# Patient Record
Sex: FEMALE | Race: AMERICAN INDIAN OR ALASKA NATIVE
[De-identification: names, ages, dates, MRNs, and addresses within clinical notes are randomized per-mention and may not be internally consistent; named-entity substitution may affect disease eponyms.]

---

## 2020-01-07 ENCOUNTER — HOSPITAL ENCOUNTER (EMERGENCY)
Dept: HOSPITAL 5 - ED | Age: 50
LOS: 1 days | Discharge: HOME | End: 2020-01-08
Payer: COMMERCIAL

## 2020-01-07 VITALS — SYSTOLIC BLOOD PRESSURE: 141 MMHG | DIASTOLIC BLOOD PRESSURE: 86 MMHG

## 2020-01-07 DIAGNOSIS — R11.2: ICD-10-CM

## 2020-01-07 DIAGNOSIS — R10.32: ICD-10-CM

## 2020-01-07 DIAGNOSIS — R10.31: ICD-10-CM

## 2020-01-07 DIAGNOSIS — E11.9: ICD-10-CM

## 2020-01-07 DIAGNOSIS — K76.0: Primary | ICD-10-CM

## 2020-01-07 LAB
ALBUMIN SERPL-MCNC: 4.4 G/DL (ref 3.9–5)
ALT SERPL-CCNC: 50 UNITS/L (ref 7–56)
BAND NEUTROPHILS # (MANUAL): 0 K/MM3
BILIRUB UR QL STRIP: (no result)
BLOOD UR QL VISUAL: (no result)
BUN SERPL-MCNC: 16 MG/DL (ref 7–17)
BUN/CREAT SERPL: 23 %
CALCIUM SERPL-MCNC: 9.7 MG/DL (ref 8.4–10.2)
HCT VFR BLD CALC: 45 % (ref 30.3–42.9)
HEMOLYSIS INDEX: 55
HGB BLD-MCNC: 15 GM/DL (ref 10.1–14.3)
MCHC RBC AUTO-ENTMCNC: 33 % (ref 30–34)
MCV RBC AUTO: 89 FL (ref 79–97)
MUCOUS THREADS #/AREA URNS HPF: (no result) /HPF
MYELOCYTES # (MANUAL): 0 K/MM3
PH UR STRIP: 5 [PH] (ref 5–7)
PLATELET # BLD: 317 K/MM3 (ref 140–440)
PROMYELOCYTES # (MANUAL): 0 K/MM3
PROT UR STRIP-MCNC: (no result) MG/DL
RBC # BLD AUTO: 5.07 M/MM3 (ref 3.65–5.03)
RBC #/AREA URNS HPF: 1 /HPF (ref 0–6)
TOTAL CELLS COUNTED BLD: 100
UROBILINOGEN UR-MCNC: < 2 MG/DL (ref ?–2)
WBC #/AREA URNS HPF: 2 /HPF (ref 0–6)

## 2020-01-07 PROCEDURE — 85007 BL SMEAR W/DIFF WBC COUNT: CPT

## 2020-01-07 PROCEDURE — 85025 COMPLETE CBC W/AUTO DIFF WBC: CPT

## 2020-01-07 PROCEDURE — 96374 THER/PROPH/DIAG INJ IV PUSH: CPT

## 2020-01-07 PROCEDURE — 74177 CT ABD & PELVIS W/CONTRAST: CPT

## 2020-01-07 PROCEDURE — 83690 ASSAY OF LIPASE: CPT

## 2020-01-07 PROCEDURE — 96361 HYDRATE IV INFUSION ADD-ON: CPT

## 2020-01-07 PROCEDURE — 99284 EMERGENCY DEPT VISIT MOD MDM: CPT

## 2020-01-07 PROCEDURE — 36415 COLL VENOUS BLD VENIPUNCTURE: CPT

## 2020-01-07 PROCEDURE — 80053 COMPREHEN METABOLIC PANEL: CPT

## 2020-01-07 PROCEDURE — 81001 URINALYSIS AUTO W/SCOPE: CPT

## 2020-01-07 NOTE — EMERGENCY DEPARTMENT REPORT
ED Abdominal Pain HPI





- General


Chief Complaint: Abdominal Pain


Stated Complaint: STOMACH PAIN/VOMIT


Time Seen by Provider: 20 20:30


Source: patient


Mode of arrival: Ambulatory


Limitations: No Limitations





- History of Present Illness


Initial Comments: 


Ms. Laurent is a 50 Y/O FEMALE PRESENTS TO ED C/O OF VOMITING AND RLQ SEVERE PAIN.

NO FEVER OR CHILLS. NO HEMOPTYSIS  patient has history of hypertension and 

diabetes type 2, states nausea and vomiting exacerbated by by mouth intake.  

Symptoms are relieved by nothing tried.  There is no fever no chills, no chest 

pain or shortness of breath.  Patient has PCP at [facility] 

however she was unable to get an appointment for today.Abdominal pain for over 

10  radiating to bilateral lower quadrant.  Patient denies history of abdominal 

surgeries.


MD Complaint: abdominal pain


Onset/Timin


-: days(s)


Location: LLQ, RLQ


Radiation: LLQ, RLQ


Severity: moderate


Severity scale (0 -10): 5


Quality: cramping, aching


Consistency: constant


Improves With: nothing


Worsens With: eating


Associated Symptoms: nausea, vomiting.  denies: diarrhea, fever, chills, 

constipation, dysuria, melena





- Related Data


LMP (females 10-50): other (post menopausal)


                                  Previous Rx's











 Medication  Instructions  Recorded  Last Taken  Type


 


Dicyclomine [Bentyl] 10 mg PO QID PRN #30 capsule 20 Unknown Rx


 


Naproxen 500 mg PO BID PRN #30 tablet 20 Unknown Rx


 


Ondansetron [Zofran Odt] 4 mg PO Q8HR PRN #12 tab.rapdis 20 Unknown Rx











                                    Allergies











Allergy/AdvReac Type Severity Reaction Status Date / Time


 


No Known Allergies Allergy   Verified 20 17:41














ED Review of Systems


ROS: 


Stated complaint: STOMACH PAIN/VOMIT


Other details as noted in HPI





Constitutional: denies: chills, fever


Eyes: denies: eye pain, eye discharge, vision change


ENT: denies: ear pain, throat pain


Respiratory: denies: cough, shortness of breath, wheezing


Cardiovascular: denies: chest pain, palpitations


Endocrine: no symptoms reported


Gastrointestinal: abdominal pain, nausea, vomiting.  denies: diarrhea, 

constipation


Genitourinary: as per HPI.  denies: urgency, dysuria, frequency, hematuria, 

discharge


Musculoskeletal: denies: back pain, joint swelling, arthralgia


Skin: denies: rash, lesions


Neurological: denies: headache, weakness, paresthesias


Psychiatric: denies: anxiety, depression


Hematological/Lymphatic: denies: easy bleeding, easy bruising





ED Past Medical Hx





- Past Medical History


Previous Medical History?: Yes


Hx Hypertension: No


Hx CVA: No


Hx Heart Attack/AMI: No


Hx Congestive Heart Failure: No


Hx Diabetes: Yes


Hx Deep Vein Thrombosis: No


Hx Pulmonary Embolism: No


Hx GERD: No


Hx Liver Disease: No


Hx Renal Disease: No


Hx of Cancer: No


Hx Sickle Cell Disease: No


Hx Arthritis: No


Hx Headaches / Migraines: No


Hx Seizures: No


Hx Kidney Stones: No


Hx Psychiatric Treatment: No


Hx Asthma: No


Hx COPD: No


Hx Tuberculosis: No


Hx Dementia: No


Hx HIV: No





- Surgical History


Past Surgical History?: Yes


Hx Coronary Stent: No


Hx Open Heart Surgery: No


Hx Pacemaker: No


Hx Internal Defibrillator: No


Hx Cholecystectomy: No


Hx Appendectomy: No


Hx Breast Surgery: No


Additional Surgical History: l5 fusion





- Social History


Smoking Status: Never Smoker


Substance Use Type: None





- Medications


Home Medications: 


                                Home Medications











 Medication  Instructions  Recorded  Confirmed  Last Taken  Type


 


Dicyclomine [Bentyl] 10 mg PO QID PRN #30 capsule 20  Unknown Rx


 


Naproxen 500 mg PO BID PRN #30 tablet 20  Unknown Rx


 


Ondansetron [Zofran Odt] 4 mg PO Q8HR PRN #12 tab.rapdis 20  Unknown Rx














ED Physical Exam





- General


Limitations: No Limitations


General appearance: alert, in no apparent distress





- Head


Head exam: Present: atraumatic, normocephalic





- Eye


Eye exam: Present: normal appearance, PERRL, EOMI


Pupils: Present: normal accommodation





- ENT


ENT exam: Present: mucous membranes moist





- Neck


Neck exam: Present: normal inspection, full ROM.  Absent: tenderness, 

lymphadenopathy





- Respiratory


Respiratory exam: Present: normal lung sounds bilaterally.  Absent: respiratory 

distress, wheezes, stridor, chest wall tenderness





- Cardiovascular


Cardiovascular Exam: Present: regular rate, normal heart sounds





- GI/Abdominal


GI/Abdominal exam: Present: soft, normal bowel sounds.  Absent: distended, 

tenderness (bilat lower quads ), guarding, rebound, rigid, bruit, hernia





- Rectal


Rectal exam: Present: deferred





- Extremities Exam


Extremities exam: Present: normal inspection, full ROM, normal capillary refill.

 Absent: tenderness, pedal edema, joint swelling, calf tenderness





- Back Exam


Back exam: Present: normal inspection, full ROM.  Absent: tenderness, CVA 

tenderness (R), CVA tenderness (L), muscle spasm, vertebral tenderness, rash 

noted





- Neurological Exam


Neurological exam: Present: alert, oriented X3, CN II-XII intact, normal gait





- Psychiatric


Psychiatric exam: Present: normal affect, normal mood





- Skin


Skin exam: Present: warm, dry, intact, normal color.  Absent: rash





ED Course


                                   Vital Signs











  20





  17:41 20:31


 


Temperature 97.8 F 97.8 F


 


Pulse Rate 89 89


 


Respiratory 18 18





Rate  


 


Blood Pressure 141/86 141/86


 


O2 Sat by Pulse 98 98





Oximetry  














ED Medical Decision Making





- Lab Data


Result diagrams: 


                                 20 17:52





                                 20 17:52








Labs











  20





  17:52 17:52 Unknown


 


WBC  20.3 H  


 


RBC  5.07 H  


 


Hgb  15.0 H  


 


Hct  45.0 H  


 


MCV  89  


 


MCH  30  


 


MCHC  33  


 


RDW  12.0 L  


 


Plt Count  317  


 


Add Manual Diff  Complete  


 


Total Counted  100  


 


Seg Neuts % (Manual)  87.0 H  


 


Band Neutrophils %  0  


 


Lymphocytes % (Manual)  11.0 L  


 


Reactive Lymphs % (Man)  0  


 


Monocytes % (Manual)  2.0  


 


Eosinophils % (Manual)  0  


 


Basophils % (Manual)  0  


 


Metamyelocytes %  0  


 


Myelocytes %  0  


 


Promyelocytes %  0  


 


Blast Cells %  0  


 


Nucleated RBC %  Not Reportable  


 


Seg Neutrophils # Man  17.7 H  


 


Band Neutrophils #  0.0  


 


Lymphocytes # (Manual)  2.2  


 


Abs React Lymphs (Man)  0.0  


 


Monocytes # (Manual)  0.4  


 


Eosinophils # (Manual)  0.0  


 


Basophils # (Manual)  0.0  


 


Metamyelocytes #  0.0  


 


Myelocytes #  0.0  


 


Promyelocytes #  0.0  


 


Blast Cells #  0.0  


 


WBC Morphology  Not Reportable  


 


Hypersegmented Neuts  Not Reportable  


 


Hyposegmented Neuts  Not Reportable  


 


Hypogranular Neuts  Not Reportable  


 


Smudge Cells  Not Reportable  


 


Toxic Granulation  Not Reportable  


 


Toxic Vacuolation  Not Reportable  


 


Dohle Bodies  Not Reportable  


 


Pelger-Huet Anomaly  Not Reportable  


 


Yulia Rods  Not Reportable  


 


Platelet Estimate  Appears normal  


 


Clumped Platelets  Not Reportable  


 


Plt Clumps, EDTA  Not Reportable  


 


Large Platelets  Not Reportable  


 


Giant Platelets  Not Reportable  


 


Platelet Satelliting  Not Reportable  


 


Plt Morphology Comment  Not Reportable  


 


RBC Morphology  Not Reportable  


 


Dimorphic RBCs  Not Reportable  


 


Polychromasia  Not Reportable  


 


Hypochromasia  Not Reportable  


 


Poikilocytosis  Not Reportable  


 


Anisocytosis  Few  


 


Microcytosis  Not Reportable  


 


Macrocytosis  Not Reportable  


 


Spherocytes  Not Reportable  


 


Pappenheimer Bodies  Not Reportable  


 


Sickle Cells  Not Reportable  


 


Target Cells  Not Reportable  


 


Tear Drop Cells  Not Reportable  


 


Ovalocytes  Not Reportable  


 


Helmet Cells  Not Reportable  


 


[place] Bodies  Not Reportable  


 


[place] Rings  Not Reportable  


 


[place] Cells  Not Reportable  


 


Bite Cells  Not Reportable  


 


Crenated Cell  Not Reportable  


 


Elliptocytes  Not Reportable  


 


Acanthocytes (Spur)  Not Reportable  


 


Rouleaux  Not Reportable  


 


Hemoglobin C Crystals  Not Reportable  


 


Schistocytes  Not Reportable  


 


Malaria parasites  Not Reportable  


 


Cipriano Bodies  Not Reportable  


 


Hem Pathologist Commnt  No  


 


Sodium   134 L 


 


Potassium   4.1 


 


Chloride   93.7 L 


 


Carbon Dioxide   21 L 


 


Anion Gap   23 


 


BUN   16 


 


Creatinine   0.7 


 


Estimated GFR   > 60 


 


BUN/Creatinine Ratio   23 


 


Glucose   269 H 


 


Calcium   9.7 


 


Total Bilirubin   0.40 


 


AST   26 


 


ALT   50 


 


Alkaline Phosphatase   110 


 


Total Protein   8.1 


 


Albumin   4.4 


 


Albumin/Globulin Ratio   1.2 


 


Lipase   34 


 


Urine Color    Yellow


 


Urine Turbidity    Clear


 


Urine pH    5.0


 


Ur Specific Gravity    1.035 H


 


Urine Protein    <15 mg/dl


 


Urine Glucose (UA)    >=500


 


Urine Ketones    20


 


Urine Blood    Neg


 


Urine Nitrite    Neg


 


Urine Bilirubin    Neg


 


Urine Urobilinogen    < 2.0


 


Ur Leukocyte Esterase    Neg


 


Urine WBC (Auto)    2.0


 


Urine RBC (Auto)    1.0


 


U Epithel Cells (Auto)    < 1.0


 


Urine Mucus    Few














- Radiology Data


Radiology results: report reviewed, image reviewed





Ordering Physician: CORIE BARRETT


Date of Service: 20


Procedure(s): CT abdomen pelvis w con


Accession Number(s): [id number]





cc: CORIE BARRETT








CT ABDOMEN AND PELVIS WITH IV CONTRAST





INDICATION: Right lower quadrant abdominal pain





TECHNIQUE: Following the administration of intravenous contrast, multiple axial 

CT images of the


abdomen and pelvis were acquired. Sagittal and coronal reformats were obtained. 

All CT performed at


this facility utilize dose reduction techniques including automated exposure 

control, iterative


reconstruction and weight based dosing when appropriate to reduce patient 

radiation dose to as low


as reasonably achievable.





COMPARISON: None





FINDINGS:


Limited imaging of the bilateral lung bases demonstrates no acute abnormality.





Abdomen: The liver is enlarged measuring 17 cm in greatest craniocaudal 

dimension. There is


moderately decreased attenuation throughout the hepatic parenchyma. The 

gallbladder, spleen,


pancreas, bilateral adrenal glands and bilateral kidneys show no evidence of 

acute abnormality.


There is no evidence of bowel obstruction or free fluid. The appendix is not 

clearly identified,


although there are no secondary signs of right lower quadrant inflammatory 

change.





Pelvis: No free fluid is seen within the pelvis. The uterus and urinary bladder 

appear grossly


normal.





Bones and Soft Tissues: Evaluation of bony structures demonstrates previous 

posterior surgical


stabilization of the L5-S1 level. Soft tissue structures appear grossly normal.





IMPRESSION:


1. No CT evidence of acute inflammatory or obstructive process within the a

bdomen or pelvis.


2. Hepatomegaly with hepatic steatosis.





Signer Name: Tiffanie Erazo MD


Signed: 2020 12:12 AM


Workstation Name: [id number]








Transcribed By: EB


Dictated By: Tiffanie Erazo MD


Electronically Authenticated By: Tiffanie Erazo MD


Signed Date/Time: 20 0012











DD/DT: 20 0008


TD/TT:








- Medical Decision Making


 CT abdomen and pelvis normal, no bleed ,no mass, no other abnormality.  Patient

is now tolerating by mouth intake.   vital signs are now normal.  This is likely

mild dehydration.  Plan: Zofran, Bentyl, naproxen, follow up with PCP At [facility] ([facility] jin 2-3 days.  Patient verbalizes agreement and 

understanding of discharge plan patient will be DC'd home in stable condition at

this time.





Critical care attestation.: 


If time is entered above; I have spent that time in minutes in the direct care 

of this critically ill patient, excluding procedure time.








ED Disposition


Clinical Impression: 


 Fatty liver





Abdominal pain


Qualifiers:


 Abdominal location: lower abdomen, unspecified Qualified Code(s): R10.30 - 

Lower abdominal pain, unspecified





Disposition: DC-01 TO HOME OR SELFCARE


Is pt being admited?: No


Does the pt Need Aspirin: No


Condition: Stable


Instructions:  Abdominal Pain (ED), Acute Nausea and Vomiting (ED), Dehydration 

(ED)


Additional Instructions: 


follow up with your [facility] Doctor in 2 days. 


Prescriptions: 


Dicyclomine [Bentyl] 10 mg PO QID PRN #30 capsule


 PRN Reason: abdominal spasm


Naproxen 500 mg PO BID PRN #30 tablet


 PRN Reason: pain


Ondansetron [Zofran Odt] 4 mg PO Q8HR PRN #12 tab.rapdis


 PRN Reason: Nausea And Vomiting


Referrals: 


PRIMARY CARE,MD [Primary Care Provider] - 3-5 Days


Forms:  Work/School Release Form(ED)


Time of Disposition: 01:25

## 2020-01-07 NOTE — EVENT NOTE
ED Screening Note


ED Screening Note: 





50 Y/O FEMALE PRESENTS TO ED C/O OF VOMITING AND RLQ SEVERE PAIN. NO FEVER OR 

CHILLS. NO HEMOPTYSIS 





The patient was seen in triage for  ABOVE


Labs/imaging ordered to evaluate for a cause of this complaint.


Vital signs reviewed, patient awake and alert in NAD.





This initial assessment/diagnostic orders/clinical plan/treatment(s) is/are 

subject to change based on patients health status, clinical progression and re-

assessment by fellow clinical providers in the ED. Further treatment and workup 

at subsequent clinical providers discretion. Patient/guardian urged not to elope

from the ED as their condition may be serious if not clinically assessed and 

managed. 





Initial orders include: 


CT ABDOMEN 


LABS


IV

## 2020-01-08 NOTE — CAT SCAN REPORT
CT ABDOMEN AND PELVIS WITH IV CONTRAST



INDICATION: Right lower quadrant abdominal pain 



TECHNIQUE: Following the administration of intravenous contrast, multiple axial CT images of the abdo
men and pelvis were acquired. Sagittal and coronal reformats were obtained.  All CT performed at this
 facility utilize dose reduction techniques including automated exposure control, iterative reconstru
ction and weight based dosing when appropriate to reduce patient radiation dose to as low as reasonab
ly achievable.  



COMPARISON: None



FINDINGS:

Limited imaging of the bilateral lung bases demonstrates no acute abnormality.



Abdomen: The liver is enlarged measuring 17 cm in greatest craniocaudal dimension. There is moderatel
y decreased attenuation throughout the hepatic parenchyma. The gallbladder, spleen, pancreas, bilater
al adrenal glands and bilateral kidneys show no evidence of acute abnormality. There is no evidence o
f bowel obstruction or free fluid. The appendix is not clearly identified, although there are no seco
ndary signs of right lower quadrant inflammatory change.



Pelvis: No free fluid is seen within the pelvis. The uterus and urinary bladder appear grossly normal
.



Bones and Soft Tissues: Evaluation of bony structures demonstrates previous posterior surgical stabil
ization of the L5-S1 level. Soft tissue structures appear grossly normal.



IMPRESSION:

1. No CT evidence of acute inflammatory or obstructive process within the abdomen or pelvis.

2. Hepatomegaly with hepatic steatosis.



Signer Name: Tiffanie Erazo MD 

Signed: 1/8/2020 12:12 AM

 Workstation Name: [id number]